# Patient Record
Sex: FEMALE | Race: WHITE | NOT HISPANIC OR LATINO
[De-identification: names, ages, dates, MRNs, and addresses within clinical notes are randomized per-mention and may not be internally consistent; named-entity substitution may affect disease eponyms.]

---

## 2021-11-08 ENCOUNTER — RESULT REVIEW (OUTPATIENT)
Age: 31
End: 2021-11-08

## 2022-03-11 ENCOUNTER — OUTPATIENT (OUTPATIENT)
Dept: OUTPATIENT SERVICES | Age: 32
LOS: 1 days | Discharge: HOME | End: 2022-03-11

## 2022-03-11 PROBLEM — Z00.00 ENCOUNTER FOR PREVENTIVE HEALTH EXAMINATION: Status: ACTIVE | Noted: 2022-03-11

## 2022-03-14 ENCOUNTER — APPOINTMENT (OUTPATIENT)
Dept: PEDIATRIC CARDIOLOGY | Facility: CLINIC | Age: 32
End: 2022-03-14
Payer: COMMERCIAL

## 2022-03-22 ENCOUNTER — OUTPATIENT (OUTPATIENT)
Dept: OUTPATIENT SERVICES | Age: 32
LOS: 1 days | Discharge: HOME | End: 2022-03-22

## 2022-03-23 ENCOUNTER — APPOINTMENT (OUTPATIENT)
Dept: PEDIATRIC CARDIOLOGY | Facility: CLINIC | Age: 32
End: 2022-03-23
Payer: COMMERCIAL

## 2022-03-23 PROCEDURE — 99203 OFFICE O/P NEW LOW 30 MIN: CPT | Mod: 25

## 2022-03-23 PROCEDURE — 93325 DOPPLER ECHO COLOR FLOW MAPG: CPT | Mod: 59

## 2022-03-23 PROCEDURE — 76825 ECHO EXAM OF FETAL HEART: CPT

## 2022-03-23 PROCEDURE — 76820 UMBILICAL ARTERY ECHO: CPT

## 2022-03-23 PROCEDURE — 76827 ECHO EXAM OF FETAL HEART: CPT

## 2022-06-14 ENCOUNTER — OUTPATIENT (OUTPATIENT)
Dept: OUTPATIENT SERVICES | Facility: HOSPITAL | Age: 32
LOS: 1 days | End: 2022-06-14
Payer: COMMERCIAL

## 2022-06-14 DIAGNOSIS — Z11.52 ENCOUNTER FOR SCREENING FOR COVID-19: ICD-10-CM

## 2022-06-14 LAB — SARS-COV-2 RNA SPEC QL NAA+PROBE: SIGNIFICANT CHANGE UP

## 2022-06-14 PROCEDURE — U0003: CPT

## 2022-06-14 PROCEDURE — C9803: CPT

## 2022-06-14 PROCEDURE — U0005: CPT

## 2022-06-15 ENCOUNTER — TRANSCRIPTION ENCOUNTER (OUTPATIENT)
Age: 32
End: 2022-06-15

## 2022-06-16 ENCOUNTER — INPATIENT (INPATIENT)
Facility: HOSPITAL | Age: 32
LOS: 1 days | Discharge: HOME | End: 2022-06-18
Attending: OBSTETRICS & GYNECOLOGY | Admitting: OBSTETRICS & GYNECOLOGY
Payer: COMMERCIAL

## 2022-06-16 VITALS — DIASTOLIC BLOOD PRESSURE: 92 MMHG | SYSTOLIC BLOOD PRESSURE: 133 MMHG

## 2022-06-16 DIAGNOSIS — O48.0 POST-TERM PREGNANCY: ICD-10-CM

## 2022-06-16 DIAGNOSIS — K08.409 PARTIAL LOSS OF TEETH, UNSPECIFIED CAUSE, UNSPECIFIED CLASS: Chronic | ICD-10-CM

## 2022-06-16 LAB
ALBUMIN SERPL ELPH-MCNC: 3.4 G/DL — SIGNIFICANT CHANGE UP (ref 3.3–5)
ALP SERPL-CCNC: 177 U/L — HIGH (ref 40–120)
ALT FLD-CCNC: 10 U/L — SIGNIFICANT CHANGE UP (ref 10–45)
ANION GAP SERPL CALC-SCNC: 14 MMOL/L — SIGNIFICANT CHANGE UP (ref 5–17)
APTT BLD: 28.5 SEC — SIGNIFICANT CHANGE UP (ref 27.5–35.5)
AST SERPL-CCNC: 19 U/L — SIGNIFICANT CHANGE UP (ref 10–40)
BASOPHILS # BLD AUTO: 0.07 K/UL — SIGNIFICANT CHANGE UP (ref 0–0.2)
BASOPHILS NFR BLD AUTO: 0.8 % — SIGNIFICANT CHANGE UP (ref 0–2)
BILIRUB SERPL-MCNC: 0.2 MG/DL — SIGNIFICANT CHANGE UP (ref 0.2–1.2)
BUN SERPL-MCNC: 9 MG/DL — SIGNIFICANT CHANGE UP (ref 7–23)
CALCIUM SERPL-MCNC: 9 MG/DL — SIGNIFICANT CHANGE UP (ref 8.4–10.5)
CHLORIDE SERPL-SCNC: 102 MMOL/L — SIGNIFICANT CHANGE UP (ref 96–108)
CO2 SERPL-SCNC: 19 MMOL/L — LOW (ref 22–31)
COVID-19 SPIKE DOMAIN AB INTERP: POSITIVE
COVID-19 SPIKE DOMAIN ANTIBODY RESULT: >250 U/ML — HIGH
CREAT SERPL-MCNC: 0.66 MG/DL — SIGNIFICANT CHANGE UP (ref 0.5–1.3)
EGFR: 119 ML/MIN/1.73M2 — SIGNIFICANT CHANGE UP
EOSINOPHIL # BLD AUTO: 0.12 K/UL — SIGNIFICANT CHANGE UP (ref 0–0.5)
EOSINOPHIL NFR BLD AUTO: 1.3 % — SIGNIFICANT CHANGE UP (ref 0–6)
FIBRINOGEN PPP-MCNC: 611 MG/DL — HIGH (ref 330–520)
GLUCOSE SERPL-MCNC: 72 MG/DL — SIGNIFICANT CHANGE UP (ref 70–99)
HCT VFR BLD CALC: 32.6 % — LOW (ref 34.5–45)
HGB BLD-MCNC: 11.2 G/DL — LOW (ref 11.5–15.5)
IMM GRANULOCYTES NFR BLD AUTO: 2.3 % — HIGH (ref 0–1.5)
INR BLD: 0.98 RATIO — SIGNIFICANT CHANGE UP (ref 0.88–1.16)
LDH SERPL L TO P-CCNC: 164 U/L — SIGNIFICANT CHANGE UP (ref 50–242)
LYMPHOCYTES # BLD AUTO: 2.14 K/UL — SIGNIFICANT CHANGE UP (ref 1–3.3)
LYMPHOCYTES # BLD AUTO: 23.8 % — SIGNIFICANT CHANGE UP (ref 13–44)
MCHC RBC-ENTMCNC: 31.6 PG — SIGNIFICANT CHANGE UP (ref 27–34)
MCHC RBC-ENTMCNC: 34.4 GM/DL — SIGNIFICANT CHANGE UP (ref 32–36)
MCV RBC AUTO: 92.1 FL — SIGNIFICANT CHANGE UP (ref 80–100)
MONOCYTES # BLD AUTO: 0.75 K/UL — SIGNIFICANT CHANGE UP (ref 0–0.9)
MONOCYTES NFR BLD AUTO: 8.3 % — SIGNIFICANT CHANGE UP (ref 2–14)
NEUTROPHILS # BLD AUTO: 5.7 K/UL — SIGNIFICANT CHANGE UP (ref 1.8–7.4)
NEUTROPHILS NFR BLD AUTO: 63.5 % — SIGNIFICANT CHANGE UP (ref 43–77)
NRBC # BLD: 0 /100 WBCS — SIGNIFICANT CHANGE UP (ref 0–0)
PLATELET # BLD AUTO: 192 K/UL — SIGNIFICANT CHANGE UP (ref 150–400)
POTASSIUM SERPL-MCNC: 3.9 MMOL/L — SIGNIFICANT CHANGE UP (ref 3.5–5.3)
POTASSIUM SERPL-SCNC: 3.9 MMOL/L — SIGNIFICANT CHANGE UP (ref 3.5–5.3)
PROT SERPL-MCNC: 6.3 G/DL — SIGNIFICANT CHANGE UP (ref 6–8.3)
PROTHROM AB SERPL-ACNC: 11.4 SEC — SIGNIFICANT CHANGE UP (ref 10.5–13.4)
RBC # BLD: 3.54 M/UL — LOW (ref 3.8–5.2)
RBC # FLD: 13.1 % — SIGNIFICANT CHANGE UP (ref 10.3–14.5)
SARS-COV-2 IGG+IGM SERPL QL IA: >250 U/ML — HIGH
SARS-COV-2 IGG+IGM SERPL QL IA: POSITIVE
SODIUM SERPL-SCNC: 136 MMOL/L — SIGNIFICANT CHANGE UP (ref 135–145)
T PALLIDUM AB TITR SER: NEGATIVE — SIGNIFICANT CHANGE UP
URATE SERPL-MCNC: 6.7 MG/DL — SIGNIFICANT CHANGE UP (ref 2.5–7)
WBC # BLD: 8.99 K/UL — SIGNIFICANT CHANGE UP (ref 3.8–10.5)
WBC # FLD AUTO: 8.99 K/UL — SIGNIFICANT CHANGE UP (ref 3.8–10.5)

## 2022-06-16 RX ORDER — CITRIC ACID/SODIUM CITRATE 300-500 MG
15 SOLUTION, ORAL ORAL ONCE
Refills: 0 | Status: COMPLETED | OUTPATIENT
Start: 2022-06-16 | End: 2022-06-16

## 2022-06-16 RX ORDER — OXYCODONE HYDROCHLORIDE 5 MG/1
5 TABLET ORAL
Refills: 0 | Status: DISCONTINUED | OUTPATIENT
Start: 2022-06-16 | End: 2022-06-18

## 2022-06-16 RX ORDER — OXYTOCIN 10 UNIT/ML
333.33 VIAL (ML) INJECTION
Qty: 20 | Refills: 0 | Status: DISCONTINUED | OUTPATIENT
Start: 2022-06-16 | End: 2022-06-18

## 2022-06-16 RX ORDER — ONDANSETRON 8 MG/1
4 TABLET, FILM COATED ORAL EVERY 6 HOURS
Refills: 0 | Status: DISCONTINUED | OUTPATIENT
Start: 2022-06-16 | End: 2022-06-17

## 2022-06-16 RX ORDER — SODIUM CHLORIDE 9 MG/ML
1000 INJECTION, SOLUTION INTRAVENOUS
Refills: 0 | Status: DISCONTINUED | OUTPATIENT
Start: 2022-06-16 | End: 2022-06-16

## 2022-06-16 RX ORDER — SODIUM CHLORIDE 9 MG/ML
1000 INJECTION, SOLUTION INTRAVENOUS
Refills: 0 | Status: DISCONTINUED | OUTPATIENT
Start: 2022-06-16 | End: 2022-06-18

## 2022-06-16 RX ORDER — NALOXONE HYDROCHLORIDE 4 MG/.1ML
0.1 SPRAY NASAL
Refills: 0 | Status: DISCONTINUED | OUTPATIENT
Start: 2022-06-16 | End: 2022-06-17

## 2022-06-16 RX ORDER — TETANUS TOXOID, REDUCED DIPHTHERIA TOXOID AND ACELLULAR PERTUSSIS VACCINE, ADSORBED 5; 2.5; 8; 8; 2.5 [IU]/.5ML; [IU]/.5ML; UG/.5ML; UG/.5ML; UG/.5ML
0.5 SUSPENSION INTRAMUSCULAR ONCE
Refills: 0 | Status: DISCONTINUED | OUTPATIENT
Start: 2022-06-16 | End: 2022-06-18

## 2022-06-16 RX ORDER — SIMETHICONE 80 MG/1
80 TABLET, CHEWABLE ORAL EVERY 4 HOURS
Refills: 0 | Status: DISCONTINUED | OUTPATIENT
Start: 2022-06-16 | End: 2022-06-18

## 2022-06-16 RX ORDER — LANOLIN
1 OINTMENT (GRAM) TOPICAL EVERY 6 HOURS
Refills: 0 | Status: DISCONTINUED | OUTPATIENT
Start: 2022-06-16 | End: 2022-06-18

## 2022-06-16 RX ORDER — OXYCODONE HYDROCHLORIDE 5 MG/1
5 TABLET ORAL ONCE
Refills: 0 | Status: DISCONTINUED | OUTPATIENT
Start: 2022-06-16 | End: 2022-06-18

## 2022-06-16 RX ORDER — DIPHENHYDRAMINE HCL 50 MG
25 CAPSULE ORAL EVERY 6 HOURS
Refills: 0 | Status: DISCONTINUED | OUTPATIENT
Start: 2022-06-16 | End: 2022-06-18

## 2022-06-16 RX ORDER — FAMOTIDINE 10 MG/ML
20 INJECTION INTRAVENOUS ONCE
Refills: 0 | Status: COMPLETED | OUTPATIENT
Start: 2022-06-16 | End: 2022-06-16

## 2022-06-16 RX ORDER — DEXAMETHASONE 0.5 MG/5ML
4 ELIXIR ORAL EVERY 6 HOURS
Refills: 0 | Status: DISCONTINUED | OUTPATIENT
Start: 2022-06-16 | End: 2022-06-17

## 2022-06-16 RX ORDER — KETOROLAC TROMETHAMINE 30 MG/ML
30 SYRINGE (ML) INJECTION EVERY 6 HOURS
Refills: 0 | Status: COMPLETED | OUTPATIENT
Start: 2022-06-16 | End: 2022-06-18

## 2022-06-16 RX ORDER — HEPARIN SODIUM 5000 [USP'U]/ML
5000 INJECTION INTRAVENOUS; SUBCUTANEOUS EVERY 12 HOURS
Refills: 0 | Status: DISCONTINUED | OUTPATIENT
Start: 2022-06-16 | End: 2022-06-18

## 2022-06-16 RX ORDER — NALBUPHINE HYDROCHLORIDE 10 MG/ML
2.5 INJECTION, SOLUTION INTRAMUSCULAR; INTRAVENOUS; SUBCUTANEOUS EVERY 6 HOURS
Refills: 0 | Status: DISCONTINUED | OUTPATIENT
Start: 2022-06-16 | End: 2022-06-17

## 2022-06-16 RX ORDER — INFLUENZA VIRUS VACCINE 15; 15; 15; 15 UG/.5ML; UG/.5ML; UG/.5ML; UG/.5ML
0.5 SUSPENSION INTRAMUSCULAR ONCE
Refills: 0 | Status: DISCONTINUED | OUTPATIENT
Start: 2022-06-16 | End: 2022-06-18

## 2022-06-16 RX ORDER — MAGNESIUM HYDROXIDE 400 MG/1
30 TABLET, CHEWABLE ORAL
Refills: 0 | Status: DISCONTINUED | OUTPATIENT
Start: 2022-06-16 | End: 2022-06-18

## 2022-06-16 RX ORDER — MORPHINE SULFATE 50 MG/1
0.1 CAPSULE, EXTENDED RELEASE ORAL ONCE
Refills: 0 | Status: DISCONTINUED | OUTPATIENT
Start: 2022-06-16 | End: 2022-06-17

## 2022-06-16 RX ORDER — IBUPROFEN 200 MG
600 TABLET ORAL EVERY 6 HOURS
Refills: 0 | Status: COMPLETED | OUTPATIENT
Start: 2022-06-16 | End: 2023-05-15

## 2022-06-16 RX ORDER — SODIUM CHLORIDE 9 MG/ML
1000 INJECTION, SOLUTION INTRAVENOUS ONCE
Refills: 0 | Status: COMPLETED | OUTPATIENT
Start: 2022-06-16 | End: 2022-06-16

## 2022-06-16 RX ORDER — ACETAMINOPHEN 500 MG
975 TABLET ORAL
Refills: 0 | Status: DISCONTINUED | OUTPATIENT
Start: 2022-06-16 | End: 2022-06-18

## 2022-06-16 RX ADMIN — FAMOTIDINE 20 MILLIGRAM(S): 10 INJECTION INTRAVENOUS at 08:10

## 2022-06-16 RX ADMIN — HEPARIN SODIUM 5000 UNIT(S): 5000 INJECTION INTRAVENOUS; SUBCUTANEOUS at 18:08

## 2022-06-16 RX ADMIN — Medication 975 MILLIGRAM(S): at 20:58

## 2022-06-16 RX ADMIN — SODIUM CHLORIDE 2000 MILLILITER(S): 9 INJECTION, SOLUTION INTRAVENOUS at 07:30

## 2022-06-16 RX ADMIN — Medication 975 MILLIGRAM(S): at 20:28

## 2022-06-16 RX ADMIN — Medication 30 MILLIGRAM(S): at 18:08

## 2022-06-16 RX ADMIN — Medication 30 MILLIGRAM(S): at 18:32

## 2022-06-16 RX ADMIN — Medication 15 MILLILITER(S): at 08:10

## 2022-06-16 NOTE — OB PROVIDER DELIVERY SUMMARY - NSPROVIDERDELIVERYNOTE_OBGYN_ALL_OB_FT
Vacuum assisted primary low transverse  section performed, EBL 800cc. grossly normal uterus, tubes and ovaries. hemostasis achieved. loose nuchal cord x 1. delivered a viable male  weight 7lbs 12oz, apgar 8 and 9. IVF 2100mL, UOP 1400 mL.

## 2022-06-16 NOTE — OB RN INTRAOPERATIVE NOTE - NS_ADDITIONALPROCINFO1_OBGYN_ALL_OB_FT
pt failed caitlin test after spinal placement #1; pt repositioned to left lateral position and spinal placement # 2 by MD Singh; successful caitlin test and case proceeded

## 2022-06-16 NOTE — OB NEONATOLOGY/PEDIATRICIAN DELIVERY SUMMARY - NSPEDSNEONOTESA_OBGYN_ALL_OB_FT
room air Baby boy born at 40 6/7 wks via elective C/S to a 34 y/o  mother who is O+ blood type, HBsAg neg, HIV neg, rubella immune, RPR neg, GBS neg as of . Prenatal history of IVF. No significant maternal history. AROM at delivery with clear fluids. Baby emerged with NC x1. Cord clamping was delayed 30 secs. Infant placed on warmer, dried and stimulated. CPAP started at 3 mins of life for increased WOB with mild grunting. CPAP 5 with max Fio2 21% given for 20 mins. APGARS of 8/9 given. Mom would like to breast, consents to the birth dose of Hep B and circ. No EOS as there was no rupture of labor.

## 2022-06-16 NOTE — OB PROVIDER H&P - ATTENDING COMMENTS
pt admitted for primary c/s after office discussion re induction for post dates vx c/s    since cx was unfavorable and pelvis was clinically contracted on exam, pt elected to do c/s    pros/cons reviewed

## 2022-06-16 NOTE — OB RN PATIENT PROFILE - NUTRITION PROFILE
Addended by: JUNAID XIAO on: 2/10/2022 03:51 PM     Modules accepted: Orders     No indicators present

## 2022-06-16 NOTE — OB PROVIDER DELIVERY SUMMARY - NSSELHIDDEN_OBGYN_ALL_OB_FT
[NS_DeliveryAttending1_OBGYN_ALL_OB_FT:[id number]],[NS_DeliveryAssist1_OBGYN_ALL_OB_FT:[id number]=],[NS_DeliveryRN_OBGYN_ALL_OB_FT:[id number]==]

## 2022-06-16 NOTE — OB RN PATIENT PROFILE - FALL HARM RISK - UNIVERSAL INTERVENTIONS
Bed in lowest position, wheels locked, appropriate side rails in place/Call bell, personal items and telephone in reach/Instruct patient to call for assistance before getting out of bed or chair/Non-slip footwear when patient is out of bed/[place] to call system/Physically safe environment - no spills, clutter or unnecessary equipment/Purposeful Proactive Rounding/Room/bathroom lighting operational, light cord in reach

## 2022-06-16 NOTE — OB RN DELIVERY SUMMARY - NSSELHIDDEN_OBGYN_ALL_OB_FT
[NS_DeliveryAttending1_OBGYN_ALL_OB_FT:[id number]],[NS_DeliveryAssist1_OBGYN_ALL_OB_FT:[id number]=],[NS_DeliveryRN_OBGYN_ALL_OB_FT:[id number]]

## 2022-06-16 NOTE — OB RN INTRAOPERATIVE NOTE - NS_ORROOM _OBGYN_ALL_OB
[Normal Breath Sounds] : Normal breath sounds [Normal Heart Sounds] : normal heart sounds [Calm] : calm [de-identified] : well developed male in no acute distress [de-identified] : normal bowel sounds, wilthout distension or tenderness \par Incisions clean and closed [de-identified] : repair intact withouit swelling [de-identified] : without calf paiin or swelling A

## 2022-06-16 NOTE — OB PROVIDER H&P - NSHPLABSRESULTS_GEN_ALL_CORE
T(C): --  HR: 83 (06-16-22 @ 07:30) (76 - 87)  BP: 137/94 (06-16-22 @ 07:26) (133/92 - 137/94)  RR: --  SpO2: 97% (06-16-22 @ 07:30) (96% - 98%)

## 2022-06-16 NOTE — OB PROVIDER H&P - BIRTH SEX
[facility]  [address], [address]  [facility] [address]  Phone: [phone]  Fax: [phone]                  Herminia Wooten   5/15/2017 9:40 AM   Office Visit    Description:  Female : 1950   Provider:  Emerald Coronel MD   Department:  [facility]           Reason for Visit     Pre-op Exam                To Do List           Goals (5 Years of Data)     None      [facility]     [facility] On [facility] Nurse Care Line -  Assistance  Unless otherwise directed by your provider, please contact [facility], our nurse care line that is available for  assistance.     Registered nurses in the [facility] provide: appointment scheduling, clinical advisement, health education, and other advisory services.  Call: [phone] (toll free)               Medications           Message regarding Medications     Verify the changes and/or additions to your medication regime listed below are the same as discussed with your clinician today.  If any of these changes or additions are incorrect, please notify your healthcare provider.             Verify that the below list of medications is an accurate representation of the medications you are currently taking.  If none reported, the list may be blank. If incorrect, please contact your healthcare provider. Carry this list with you in case of emergency.           Current Medications     alprazolam (XANAX) 0.25 MG tablet Take 1 tablet (0.25 mg total) by mouth On call Procedure for Anxiety. 1 tablet 1 hour prior to scan, if needed can repeat at start of scan    atenolol (TENORMIN) 25 MG tablet Take 12.5 mg by mouth once daily.     atorvastatin (LIPITOR) 20 MG tablet Take 20 mg by mouth once daily.    cholecalciferol, vitamin D3, (VITAMIN D3) 2,000 unit Tab Take by mouth once daily.    clobetasol (TEMOVATE) 0.05 % cream APPLY 1/2 GRAM TOPICALLY twice weekly    estradiol (ESTRACE) 0.01 % (0.1  "mg/gram) vaginal cream Place 1 g vaginally every other day.    fluticasone (FLONASE) 50 mcg/actuation nasal spray 1 spray by Each Nare route once daily.    ibuprofen (ADVIL,MOTRIN) 600 MG tablet Take 1 tablet (600 mg total) by mouth every 6 (six) hours as needed for Pain.    Lactobacillus rhamnosus GG (CULTURELLE) 10 billion cell capsule Take 1 capsule by mouth once daily.    levothyroxine (SYNTHROID) 25 MCG tablet 25 mcg once daily.     lorazepam (ATIVAN) 0.5 MG tablet Take 0.5 mg by mouth every 12 (twelve) hours as needed.     losartan (COZAAR) 50 MG tablet Take 25 mg by mouth once daily.     meclizine (ANTIVERT) 25 mg tablet 25 mg 3 (three) times daily as needed.     mometasone 220 mcg (30 doses) inhaler Inhale 2 puffs into the lungs once daily.    ranitidine (ZANTAC) 150 MG tablet Take 2 tablets (300 mg total) by mouth nightly.    sodium chloride (SALINE NASAL) 0.65 % nasal spray 1 spray by Nasal route as needed.    triamcinolone acetonide 0.1% (KENALOG) 0.1 % paste     ACETYLCYSTEINE (N-ACETYL-L-CYSTEINE MISC) 600 mg by Misc.(Non-Drug; Combo Route) route once daily.    aspirin (ECOTRIN) 81 MG EC tablet Take 81 mg by mouth once daily.    betamethasone valerate 0.1% (VALISONE) 0.1 % Crea Apply topically 2 (two) times daily.    [facility] 118-10-40.8-36 mg Cap Take 1 capsule by mouth once daily.    ondansetron (ZOFRAN-ODT) 4 MG TbDL Take 1-2 tablets (4-8 mg total) by mouth every 8 (eight) hours as needed.    phenazopyridine (PYRIDIUM) 100 MG tablet TAKE ONE TABLET BY MOUTH THREE TIMES DAILY AS NEEDED FOR PAIN           Clinical Reference Information           Your Vitals Were     BP Height Weight Last Period BMI    128/84 4' 11.5" (1.511 m) 79 kg (174 lb 2.6 oz) 10/07/1976 34.59 kg/m2      Blood Pressure          Most Recent Value    BP  128/84      Allergies as of 5/15/2017     Codeine    Caffeine    Percodan [Oxycodone Hcl-oxycodone-asa]      Immunizations Administered on Date of Encounter - " 5/15/2017     None      Language Assistance Services     ATTENTION: Language assistance services are available, free of charge. Please call [phone].      ATENCIÓN: Si habla whitney, tiene a arias disposición servicios gratuitos de asistencia lingüística. Llame al [phone].     CHÚ Ý: N?u b?n nói Ti?ng Vi?t, có các d?ch v? h? tr? ngôn ng? mi?n phí dành cho b?n. G?i s? [phone].         [facility] complies with applicable Federal civil rights laws and does not discriminate on the basis of race, color, national origin, age, disability, or sex.         Female

## 2022-06-16 NOTE — OB RN PATIENT PROFILE - PRO FEEDING PLAN INFANT OB
Joe Garcia is a 38year old female who presents for Patient presents with:  Leg Pain: muscle pain  Arm Pain: muscle pain  . HPI:     I had the pleasure of seeing Joe Garcia on 3/15/2018 for evaluation.      She is a pleasant 38year old who • PONV (postoperative nausea and vomiting)     CHILLS, UNCONTROLLED SHIVERING    • Postpartum depression    • Pregnancy , ,        • Spondylolisthesis of lumbosacral region 2017   • Vitamin B12 deficiency    • Vitamin D deficiency her left side.    Eyes: No visual changes,   CVS: No chest pain, no heart disease  RS: No SOB, no Cough, No Pleurtic pain,, some Peters,    GI: No nausea, no vomiiting, no abominal pain, no hx of ulcer, no gastritis, no heartburn, no dyshpagia, no BRBPR or parvin Hematocrit      35.0 - 48.0 % 41.5   MCV      80.0 - 100.0 fL 91.3   MCH      27.0 - 32.0 pg 30.4   MCHC      32.0 - 37.0 g/dl 33.3   RDW      11.0 - 15.0 % 13.0   Platelet Count      218 - 400 K/   MEAN PLATELET VOLUME      7.4 - 10.3 fL 9.3   Mitchell inflammatory arthritis and/or connective tissue disease   rF negative - will check CCP   In the past SAMMY has been negative several times   -consider muscle relaxers at night - flexeril 5mg at [facility].    - she sleeps during the day b/c her job is at [employer] initiation of breastfeeding/breast milk feeding

## 2022-06-16 NOTE — OB PROVIDER H&P - NSHPPHYSICALEXAM_GEN_ALL_CORE
Gen: NAD  CV: RRR  Pulm: breathing comfortably on RA  Abd: gravid, nontender  Extr: moving all extremities with ease  – FHT Cat I: baseline 120, mod variability, +accels, -decels  – [place]: absent  – Sono: vertex, posterior placenta

## 2022-06-16 NOTE — OB PROVIDER H&P - HISTORY OF PRESENT ILLNESS
HPI: 31yo F  @40+6  presents for scheduled pLTCS. +FM. -LOF. -CTXs. -VB. Pt denies any other concerns. IVF pregnancy    PNC: Denies prenatal issues.   GBS neg.    EFW 3770g by neela on     OBHx: primigravida  GynHx: denies hx STIs, fibroids, polyps, cysts  PMH: denies hx clotting or bleeding disorders, HTN, DM  PSH: denies  PFH: no hx congenital disorders, bleeding/clotting disorders  Psych: denies   Social: denies etoh, smoking, drugs. Safe at home/in relationship.  Meds: PNV, iron, ASA 81mg  Allergies: NKDA  Will accept blood transfusions? Yes

## 2022-06-16 NOTE — OB PROVIDER H&P - ASSESSMENT
31yo  @40+6 for elective pLTCS for macrosomia    Plan  - Admit to LND. Routine Labs. IVF.  - pLTCS  - Fetus: cat 1 tracing. VTX. EFW extrapolated to approx 3970g by sono. Continuous EFM. No concerns.  - Prenatal issues: macrosomia  - GBS neg  - pba, Pepcid    Patient discussed with attending physician, Dr. Dexter.  OCTAVIO Lentz, PGY1

## 2022-06-17 LAB
BASOPHILS # BLD AUTO: 0.05 K/UL — SIGNIFICANT CHANGE UP (ref 0–0.2)
BASOPHILS NFR BLD AUTO: 0.3 % — SIGNIFICANT CHANGE UP (ref 0–2)
EOSINOPHIL # BLD AUTO: 0.04 K/UL — SIGNIFICANT CHANGE UP (ref 0–0.5)
EOSINOPHIL NFR BLD AUTO: 0.3 % — SIGNIFICANT CHANGE UP (ref 0–6)
HCT VFR BLD CALC: 26.4 % — LOW (ref 34.5–45)
HGB BLD-MCNC: 8.7 G/DL — LOW (ref 11.5–15.5)
IMM GRANULOCYTES NFR BLD AUTO: 1.1 % — SIGNIFICANT CHANGE UP (ref 0–1.5)
LYMPHOCYTES # BLD AUTO: 16.6 % — SIGNIFICANT CHANGE UP (ref 13–44)
LYMPHOCYTES # BLD AUTO: 2.54 K/UL — SIGNIFICANT CHANGE UP (ref 1–3.3)
MCHC RBC-ENTMCNC: 31 PG — SIGNIFICANT CHANGE UP (ref 27–34)
MCHC RBC-ENTMCNC: 33 GM/DL — SIGNIFICANT CHANGE UP (ref 32–36)
MCV RBC AUTO: 94 FL — SIGNIFICANT CHANGE UP (ref 80–100)
MONOCYTES # BLD AUTO: 1 K/UL — HIGH (ref 0–0.9)
MONOCYTES NFR BLD AUTO: 6.5 % — SIGNIFICANT CHANGE UP (ref 2–14)
NEUTROPHILS # BLD AUTO: 11.52 K/UL — HIGH (ref 1.8–7.4)
NEUTROPHILS NFR BLD AUTO: 75.2 % — SIGNIFICANT CHANGE UP (ref 43–77)
NRBC # BLD: 0 /100 WBCS — SIGNIFICANT CHANGE UP (ref 0–0)
PLATELET # BLD AUTO: 200 K/UL — SIGNIFICANT CHANGE UP (ref 150–400)
RBC # BLD: 2.81 M/UL — LOW (ref 3.8–5.2)
RBC # FLD: 13.2 % — SIGNIFICANT CHANGE UP (ref 10.3–14.5)
WBC # BLD: 15.32 K/UL — HIGH (ref 3.8–10.5)
WBC # FLD AUTO: 15.32 K/UL — HIGH (ref 3.8–10.5)

## 2022-06-17 RX ORDER — IBUPROFEN 200 MG
600 TABLET ORAL EVERY 6 HOURS
Refills: 0 | Status: DISCONTINUED | OUTPATIENT
Start: 2022-06-17 | End: 2022-06-18

## 2022-06-17 RX ADMIN — Medication 975 MILLIGRAM(S): at 03:30

## 2022-06-17 RX ADMIN — Medication 975 MILLIGRAM(S): at 21:57

## 2022-06-17 RX ADMIN — Medication 30 MILLIGRAM(S): at 11:11

## 2022-06-17 RX ADMIN — Medication 975 MILLIGRAM(S): at 22:25

## 2022-06-17 RX ADMIN — Medication 975 MILLIGRAM(S): at 15:31

## 2022-06-17 RX ADMIN — Medication 600 MILLIGRAM(S): at 18:30

## 2022-06-17 RX ADMIN — HEPARIN SODIUM 5000 UNIT(S): 5000 INJECTION INTRAVENOUS; SUBCUTANEOUS at 05:58

## 2022-06-17 RX ADMIN — Medication 30 MILLIGRAM(S): at 06:29

## 2022-06-17 RX ADMIN — HEPARIN SODIUM 5000 UNIT(S): 5000 INJECTION INTRAVENOUS; SUBCUTANEOUS at 18:01

## 2022-06-17 RX ADMIN — Medication 975 MILLIGRAM(S): at 15:01

## 2022-06-17 RX ADMIN — Medication 30 MILLIGRAM(S): at 05:59

## 2022-06-17 RX ADMIN — Medication 975 MILLIGRAM(S): at 09:02

## 2022-06-17 RX ADMIN — Medication 30 MILLIGRAM(S): at 00:05

## 2022-06-17 RX ADMIN — Medication 30 MILLIGRAM(S): at 11:41

## 2022-06-17 RX ADMIN — Medication 975 MILLIGRAM(S): at 09:42

## 2022-06-17 RX ADMIN — Medication 975 MILLIGRAM(S): at 03:00

## 2022-06-17 RX ADMIN — Medication 600 MILLIGRAM(S): at 17:53

## 2022-06-17 RX ADMIN — Medication 30 MILLIGRAM(S): at 00:35

## 2022-06-17 NOTE — PROGRESS NOTE ADULT - ASSESSMENT
A/P: 31yo POD#1 s/p LTCS.  Patient is stable and doing well post-operatively.    - Continue regular diet.  - Increase ambulation.  - Continue motrin, tylenol, oxycodone PRN for pain control  - F/u AM CBC    CLARENCE Quinteros MD  PGY-1

## 2022-06-18 ENCOUNTER — TRANSCRIPTION ENCOUNTER (OUTPATIENT)
Age: 32
End: 2022-06-18

## 2022-06-18 PROCEDURE — 86900 BLOOD TYPING SEROLOGIC ABO: CPT

## 2022-06-18 PROCEDURE — 59050 FETAL MONITOR W/REPORT: CPT

## 2022-06-18 PROCEDURE — 85025 COMPLETE CBC W/AUTO DIFF WBC: CPT

## 2022-06-18 PROCEDURE — 83615 LACTATE (LD) (LDH) ENZYME: CPT

## 2022-06-18 PROCEDURE — 86769 SARS-COV-2 COVID-19 ANTIBODY: CPT

## 2022-06-18 PROCEDURE — 85610 PROTHROMBIN TIME: CPT

## 2022-06-18 PROCEDURE — 86901 BLOOD TYPING SEROLOGIC RH(D): CPT

## 2022-06-18 PROCEDURE — 80053 COMPREHEN METABOLIC PANEL: CPT

## 2022-06-18 PROCEDURE — 59025 FETAL NON-STRESS TEST: CPT

## 2022-06-18 PROCEDURE — 85730 THROMBOPLASTIN TIME PARTIAL: CPT

## 2022-06-18 PROCEDURE — 86780 TREPONEMA PALLIDUM: CPT

## 2022-06-18 PROCEDURE — 85384 FIBRINOGEN ACTIVITY: CPT

## 2022-06-18 PROCEDURE — 84550 ASSAY OF BLOOD/URIC ACID: CPT

## 2022-06-18 PROCEDURE — 86850 RBC ANTIBODY SCREEN: CPT

## 2022-06-18 RX ORDER — IBUPROFEN 200 MG
1 TABLET ORAL
Qty: 0 | Refills: 0 | DISCHARGE
Start: 2022-06-18

## 2022-06-18 RX ORDER — ACETAMINOPHEN 500 MG
3 TABLET ORAL
Qty: 0 | Refills: 0 | DISCHARGE
Start: 2022-06-18

## 2022-06-18 RX ADMIN — Medication 600 MILLIGRAM(S): at 06:15

## 2022-06-18 RX ADMIN — Medication 600 MILLIGRAM(S): at 11:33

## 2022-06-18 RX ADMIN — Medication 600 MILLIGRAM(S): at 01:15

## 2022-06-18 RX ADMIN — Medication 975 MILLIGRAM(S): at 09:15

## 2022-06-18 RX ADMIN — Medication 975 MILLIGRAM(S): at 08:37

## 2022-06-18 RX ADMIN — HEPARIN SODIUM 5000 UNIT(S): 5000 INJECTION INTRAVENOUS; SUBCUTANEOUS at 05:49

## 2022-06-18 RX ADMIN — Medication 975 MILLIGRAM(S): at 02:57

## 2022-06-18 RX ADMIN — Medication 600 MILLIGRAM(S): at 00:41

## 2022-06-18 RX ADMIN — Medication 600 MILLIGRAM(S): at 05:49

## 2022-06-18 RX ADMIN — Medication 975 MILLIGRAM(S): at 03:30

## 2022-06-18 RX ADMIN — Medication 600 MILLIGRAM(S): at 12:15

## 2022-06-18 NOTE — DISCHARGE NOTE OB - NS MD DC FALL RISK RISK
For information on Fall & Injury Prevention, visit: [email or web address] OR  [email or web address] OR  https://www.cdc.gov/steadi/patient.html

## 2022-06-18 NOTE — PROGRESS NOTE ADULT - ASSESSMENT
A/P: 33yo POD#2 s/p LTCS.  Patient is stable and doing well post-operatively.    - Continue regular diet.  - Increase ambulation.  - Continue motrin, tylenol, oxycodone PRN for pain control    CLARENCE Quinteros MD  PGY-1

## 2022-06-18 NOTE — PROGRESS NOTE ADULT - SUBJECTIVE AND OBJECTIVE BOX
Day 1 of Anesthesia Pain Management Service    SUBJECTIVE:  Pain Scale Score:          [X] Refer to charted pain scores    THERAPY: Received PF spinal morphine as above    OBJECTIVE:    Sedation:        	[X] Alert	[ ] Drowsy	[ ] Arousable      [ ] Asleep       [ ] Unresponsive    Side Effects:	[X] None	[ ] Nausea	[ ] Vomiting         [ ] Pruritus  		[ ] Weakness            [ ] Numbness	          [ ] Other:    ASSESSMENT/ PLAN  [X] Patient transitioned to prn analgesics  [X] Pain management per primary service, pain service to sign off   [X]Documentation and Verification of current medications
Day 1 of Anesthesia Pain Management Service    SUBJECTIVE: Doing ok  Pain Scale Score:          [X] Refer to charted pain scores    THERAPY:    s/p     100mcg PF morphine on 6\16\2022      MEDICATIONS  (STANDING):  acetaminophen     Tablet .. 975 milliGRAM(s) Oral <User Schedule>  diphtheria/tetanus/pertussis (acellular) Vaccine (ADAcel) 0.5 milliLiter(s) IntraMuscular once  heparin   Injectable 5000 Unit(s) SubCutaneous every 12 hours  ibuprofen  Tablet. 600 milliGRAM(s) Oral every 6 hours  influenza   Vaccine 0.5 milliLiter(s) IntraMuscular once  ketorolac   Injectable 30 milliGRAM(s) IV Push every 6 hours  lactated ringers. 1000 milliLiter(s) (125 mL/Hr) IV Continuous <Continuous>  morphine PF Spinal 0.1 milliGRAM(s) IntraThecal. once  oxytocin Infusion 333.333 milliUNIT(s)/Min (1000 mL/Hr) IV Continuous <Continuous>  oxytocin Infusion 333.333 milliUNIT(s)/Min (1000 mL/Hr) IV Continuous <Continuous>    MEDICATIONS  (PRN):  dexAMETHasone  Injectable 4 milliGRAM(s) IV Push every 6 hours PRN Nausea  diphenhydrAMINE 25 milliGRAM(s) Oral every 6 hours PRN Pruritus  lanolin Ointment 1 Application(s) Topical every 6 hours PRN Sore Nipples  magnesium hydroxide Suspension 30 milliLiter(s) Oral two times a day PRN Constipation  nalbuphine Injectable 2.5 milliGRAM(s) IV Push every 6 hours PRN Pruritus  naloxone Injectable 0.1 milliGRAM(s) IV Push every 3 minutes PRN For ANY of the following changes in patient status:  A. Breaths Per Minute LESS THAN 10, B. Oxygen saturation LESS THAN 90%, C. Sedation score of 6 for Stop After: 4 Times  ondansetron Injectable 4 milliGRAM(s) IV Push every 6 hours PRN Nausea  oxyCODONE    IR 5 milliGRAM(s) Oral every 3 hours PRN Moderate to Severe Pain (4-10)  oxyCODONE    IR 5 milliGRAM(s) Oral once PRN Moderate to Severe Pain (4-10)  simethicone 80 milliGRAM(s) Chew every 4 hours PRN Gas      OBJECTIVE:    Sedation:        	[X] Alert	 [ ] Drowsy	[ ] Arousable      [ ] Asleep       [ ] Unresponsive    Side Effects:	[X] None 	[ ] Nausea	[ ] Vomiting         [ ] Pruritus  		[ ] Weakness            [ ] Numbness	          [ ] Other:    Vital Signs Last 24 Hrs  T(C): 36.7 (17 Jun 2022 04:55), Max: 37.2 (16 Jun 2022 16:40)  T(F): 98.1 (17 Jun 2022 04:55), Max: 99 (16 Jun 2022 16:40)  HR: 90 (17 Jun 2022 04:55) (75 - 100)  BP: 122/73 (17 Jun 2022 04:55) (119/76 - 141/84)  BP(mean): 91 (16 Jun 2022 15:00) (90 - 106)  RR: 18 (17 Jun 2022 04:55) (13 - 21)  SpO2: 99% (17 Jun 2022 04:55) (96% - 100%)    ASSESSMENT/ PLAN  [X] Patient transitioned to prn analgesics  [X] Pain management per primary service, pain service to sign off   [X]Documentation and Verification of current medications
  S: 33yo POD#2 s/p LTCS. Pain is well controlled. She is tolerating a regular diet and passing flatus. She is voiding spontaneously, and ambulating without difficulty. Denies CP/SOB. Denies lightheadedness/dizziness. Denies N/V.    O:  Vitals:  Vital Signs Last 24 Hrs  T(C): 36.3 (18 Jun 2022 06:08), Max: 36.4 (17 Jun 2022 09:00)  T(F): 97.4 (18 Jun 2022 06:08), Max: 97.5 (17 Jun 2022 09:00)  HR: 71 (18 Jun 2022 06:08) (71 - 88)  BP: 132/86 (18 Jun 2022 06:08) (114/65 - 132/86)  BP(mean): --  RR: 18 (18 Jun 2022 06:08) (18 - 18)  SpO2: 100% (18 Jun 2022 06:08) (97% - 100%)    MEDICATIONS  (STANDING):  acetaminophen     Tablet .. 975 milliGRAM(s) Oral <User Schedule>  diphtheria/tetanus/pertussis (acellular) Vaccine (ADAcel) 0.5 milliLiter(s) IntraMuscular once  heparin   Injectable 5000 Unit(s) SubCutaneous every 12 hours  ibuprofen  Tablet. 600 milliGRAM(s) Oral every 6 hours  influenza   Vaccine 0.5 milliLiter(s) IntraMuscular once  ketorolac   Injectable 30 milliGRAM(s) IV Push every 6 hours  lactated ringers. 1000 milliLiter(s) (125 mL/Hr) IV Continuous <Continuous>  oxytocin Infusion 333.333 milliUNIT(s)/Min (1000 mL/Hr) IV Continuous <Continuous>  oxytocin Infusion 333.333 milliUNIT(s)/Min (1000 mL/Hr) IV Continuous <Continuous>      MEDICATIONS  (PRN):  diphenhydrAMINE 25 milliGRAM(s) Oral every 6 hours PRN Pruritus  lanolin Ointment 1 Application(s) Topical every 6 hours PRN Sore Nipples  magnesium hydroxide Suspension 30 milliLiter(s) Oral two times a day PRN Constipation  oxyCODONE    IR 5 milliGRAM(s) Oral every 3 hours PRN Moderate to Severe Pain (4-10)  oxyCODONE    IR 5 milliGRAM(s) Oral once PRN Moderate to Severe Pain (4-10)  simethicone 80 milliGRAM(s) Chew every 4 hours PRN Gas      Labs:  Blood type: O Positive  Rubella IgG: RPR: Negative                          8.7<L>   15.32<H> >-----------< 200    ( 06-17 @ 06:37 )             26.4<L>                        11.2<L>   8.99 >-----------< 192    ( 06-16 @ 07:33 )             32.6<L>    06-16-22 @ 07:57      136  |  102  |  9   ----------------------------<  72  3.9   |  19<L>  |  0.66        Ca    9.0      16 Jun 2022 07:57    TPro  6.3  /  Alb  3.4  /  TBili  0.2  /  DBili  x   /  AST  19  /  ALT  10  /  AlkPhos  177<H>  06-16-22 @ 07:57          PE:  General: NAD  Abdomen: Soft, appropriately tender, incision c/d/i.  Extremities: No erythema, no pitting edema  
S: 31yo POD#1 s/p LTCS . Her pain is well controlled. She is tolerating a regular diet but is not yet passing flatus. Denies N/V. Denies CP/SOB/lightheadedness/dizziness.  She is ambulating without difficulty.  Voiding spontaneously     O:   Vital Signs Last 24 Hrs  T(C): 37 (17 Jun 2022 00:45), Max: 37.2 (16 Jun 2022 16:40)  T(F): 98.6 (17 Jun 2022 00:45), Max: 99 (16 Jun 2022 16:40)  HR: 76 (17 Jun 2022 00:45) (75 - 101)  BP: 119/76 (17 Jun 2022 00:45) (119/76 - 145/88)  BP(mean): 91 (16 Jun 2022 15:00) (90 - 106)  RR: 18 (17 Jun 2022 00:45) (13 - 21)  SpO2: 98% (17 Jun 2022 00:45) (93% - 100%)    Labs:  Blood type: O Positive  Rubella IgG: RPR: Negative                          11.2<L>   8.99 >-----------< 192    ( 06-16 @ 07:33 )             32.6<L>    06-16-22 @ 07:57      136  |  102  |  9   ----------------------------<  72  3.9   |  19<L>  |  0.66        Ca    9.0      16 Jun 2022 07:57    TPro  6.3  /  Alb  3.4  /  TBili  0.2  /  DBili  x   /  AST  19  /  ALT  10  /  AlkPhos  177<H>  06-16-22 @ 07:57          acetaminophen     Tablet .. 975 milliGRAM(s) Oral <User Schedule>  dexAMETHasone  Injectable 4 milliGRAM(s) IV Push every 6 hours PRN  diphenhydrAMINE 25 milliGRAM(s) Oral every 6 hours PRN  diphtheria/tetanus/pertussis (acellular) Vaccine (ADAcel) 0.5 milliLiter(s) IntraMuscular once  heparin   Injectable 5000 Unit(s) SubCutaneous every 12 hours  ibuprofen  Tablet. 600 milliGRAM(s) Oral every 6 hours  influenza   Vaccine 0.5 milliLiter(s) IntraMuscular once  ketorolac   Injectable 30 milliGRAM(s) IV Push every 6 hours  lactated ringers. 1000 milliLiter(s) IV Continuous <Continuous>  lanolin Ointment 1 Application(s) Topical every 6 hours PRN  magnesium hydroxide Suspension 30 milliLiter(s) Oral two times a day PRN  morphine PF Spinal 0.1 milliGRAM(s) IntraThecal. once  nalbuphine Injectable 2.5 milliGRAM(s) IV Push every 6 hours PRN  naloxone Injectable 0.1 milliGRAM(s) IV Push every 3 minutes PRN  ondansetron Injectable 4 milliGRAM(s) IV Push every 6 hours PRN  oxyCODONE    IR 5 milliGRAM(s) Oral every 3 hours PRN  oxyCODONE    IR 5 milliGRAM(s) Oral once PRN  oxytocin Infusion 333.333 milliUNIT(s)/Min IV Continuous <Continuous>  oxytocin Infusion 333.333 milliUNIT(s)/Min IV Continuous <Continuous>  simethicone 80 milliGRAM(s) Chew every 4 hours PRN      PE:  General: NAD  Abdomen: Mildly distended, appropriately tender, incision c/d/i.  Extremities: No erythema, no pitting edema

## 2022-06-18 NOTE — DISCHARGE NOTE OB - PATIENT PORTAL LINK FT
You can access the FollowMyHealth Patient Portal offered by [facility] by registering at the following website: [email or web address]. By joining [facility]’s FollowMyHealth portal, you will also be able to view your health information using other applications (apps) compatible with our system.

## 2022-06-18 NOTE — DISCHARGE NOTE OB - MATERIALS PROVIDED
Vaccinations/[facility]  Screening Program/  Immunization Record/Breastfeeding Log/Breastfeeding Mother’s Support Group Information/Guide to Postpartum Care/[facility] Hearing Screen Program/Back To Sleep Handout/Shaken Baby Prevention Handout/Breastfeeding Guide and Packet/Birth Certificate Instructions/Discharge Medication Information for Patients and Families Pocket Guide

## 2022-06-18 NOTE — DISCHARGE NOTE OB - CARE PROVIDER_API CALL
Steve Dexter (MD)  Obstetrics and Gynecology  [address], [address]  [place] [id number]  Phone: [phone]  Fax: [phone]  Follow Up Time:

## 2022-06-18 NOTE — DISCHARGE NOTE OB - CARE PLAN
1 Principal Discharge DX:	 delivery delivered  Assessment and plan of treatment:	reg diet, nothing per vagina

## 2022-06-18 NOTE — DISCHARGE NOTE OB - MEDICATION SUMMARY - MEDICATIONS TO TAKE
I will START or STAY ON the medications listed below when I get home from the hospital:    acetaminophen 325 mg oral tablet  -- 3 tab(s) by mouth 3 times a day  -- Indication: For No pertinent past medical history    ibuprofen 600 mg oral tablet  -- 1 tab(s) by mouth every 6 hours  -- Indication: For No pertinent past medical history

## 2022-06-19 ENCOUNTER — OUTPATIENT (OUTPATIENT)
Dept: OUTPATIENT SERVICES | Facility: HOSPITAL | Age: 32
LOS: 1 days | End: 2022-06-19
Payer: COMMERCIAL

## 2022-06-19 VITALS
HEART RATE: 84 BPM | SYSTOLIC BLOOD PRESSURE: 136 MMHG | DIASTOLIC BLOOD PRESSURE: 85 MMHG | OXYGEN SATURATION: 100 % | RESPIRATION RATE: 16 BRPM

## 2022-06-19 VITALS — OXYGEN SATURATION: 99 % | HEART RATE: 89 BPM

## 2022-06-19 VITALS
OXYGEN SATURATION: 100 % | SYSTOLIC BLOOD PRESSURE: 133 MMHG | DIASTOLIC BLOOD PRESSURE: 88 MMHG | TEMPERATURE: 98 F | HEART RATE: 100 BPM | RESPIRATION RATE: 18 BRPM

## 2022-06-19 DIAGNOSIS — O29.40: ICD-10-CM

## 2022-06-19 DIAGNOSIS — K08.409 PARTIAL LOSS OF TEETH, UNSPECIFIED CAUSE, UNSPECIFIED CLASS: Chronic | ICD-10-CM

## 2022-06-19 LAB
ALBUMIN SERPL ELPH-MCNC: 3.3 G/DL — SIGNIFICANT CHANGE UP (ref 3.3–5)
ALBUMIN SERPL ELPH-MCNC: 3.6 G/DL — SIGNIFICANT CHANGE UP (ref 3.3–5)
ALP SERPL-CCNC: 130 U/L — HIGH (ref 40–120)
ALP SERPL-CCNC: 138 U/L — HIGH (ref 40–120)
ALT FLD-CCNC: 47 U/L — HIGH (ref 10–45)
ALT FLD-CCNC: 53 U/L — HIGH (ref 10–45)
ANION GAP SERPL CALC-SCNC: 14 MMOL/L — SIGNIFICANT CHANGE UP (ref 5–17)
ANION GAP SERPL CALC-SCNC: 14 MMOL/L — SIGNIFICANT CHANGE UP (ref 5–17)
APTT BLD: 27.5 SEC — SIGNIFICANT CHANGE UP (ref 27.5–35.5)
APTT BLD: 28.6 SEC — SIGNIFICANT CHANGE UP (ref 27.5–35.5)
AST SERPL-CCNC: 60 U/L — HIGH (ref 10–40)
AST SERPL-CCNC: 64 U/L — HIGH (ref 10–40)
BASOPHILS # BLD AUTO: 0 K/UL — SIGNIFICANT CHANGE UP (ref 0–0.2)
BASOPHILS # BLD AUTO: 0.07 K/UL — SIGNIFICANT CHANGE UP (ref 0–0.2)
BASOPHILS NFR BLD AUTO: 0 % — SIGNIFICANT CHANGE UP (ref 0–2)
BASOPHILS NFR BLD AUTO: 0.7 % — SIGNIFICANT CHANGE UP (ref 0–2)
BILIRUB SERPL-MCNC: 0.2 MG/DL — SIGNIFICANT CHANGE UP (ref 0.2–1.2)
BILIRUB SERPL-MCNC: 0.2 MG/DL — SIGNIFICANT CHANGE UP (ref 0.2–1.2)
BUN SERPL-MCNC: 10 MG/DL — SIGNIFICANT CHANGE UP (ref 7–23)
BUN SERPL-MCNC: 12 MG/DL — SIGNIFICANT CHANGE UP (ref 7–23)
CALCIUM SERPL-MCNC: 8.8 MG/DL — SIGNIFICANT CHANGE UP (ref 8.4–10.5)
CALCIUM SERPL-MCNC: 9.1 MG/DL — SIGNIFICANT CHANGE UP (ref 8.4–10.5)
CHLORIDE SERPL-SCNC: 105 MMOL/L — SIGNIFICANT CHANGE UP (ref 96–108)
CHLORIDE SERPL-SCNC: 106 MMOL/L — SIGNIFICANT CHANGE UP (ref 96–108)
CO2 SERPL-SCNC: 20 MMOL/L — LOW (ref 22–31)
CO2 SERPL-SCNC: 21 MMOL/L — LOW (ref 22–31)
CREAT SERPL-MCNC: 0.59 MG/DL — SIGNIFICANT CHANGE UP (ref 0.5–1.3)
CREAT SERPL-MCNC: 0.61 MG/DL — SIGNIFICANT CHANGE UP (ref 0.5–1.3)
EGFR: 121 ML/MIN/1.73M2 — SIGNIFICANT CHANGE UP
EGFR: 122 ML/MIN/1.73M2 — SIGNIFICANT CHANGE UP
EOSINOPHIL # BLD AUTO: 0 K/UL — SIGNIFICANT CHANGE UP (ref 0–0.5)
EOSINOPHIL # BLD AUTO: 0.19 K/UL — SIGNIFICANT CHANGE UP (ref 0–0.5)
EOSINOPHIL NFR BLD AUTO: 0 % — SIGNIFICANT CHANGE UP (ref 0–6)
EOSINOPHIL NFR BLD AUTO: 1.8 % — SIGNIFICANT CHANGE UP (ref 0–6)
FIBRINOGEN PPP-MCNC: 627 MG/DL — HIGH (ref 330–520)
FIBRINOGEN PPP-MCNC: 649 MG/DL — HIGH (ref 330–520)
GLUCOSE SERPL-MCNC: 110 MG/DL — HIGH (ref 70–99)
GLUCOSE SERPL-MCNC: 76 MG/DL — SIGNIFICANT CHANGE UP (ref 70–99)
HCT VFR BLD CALC: 27.8 % — LOW (ref 34.5–45)
HCT VFR BLD CALC: 28.4 % — LOW (ref 34.5–45)
HGB BLD-MCNC: 9.3 G/DL — LOW (ref 11.5–15.5)
HGB BLD-MCNC: 9.6 G/DL — LOW (ref 11.5–15.5)
IMM GRANULOCYTES NFR BLD AUTO: 3.3 % — HIGH (ref 0–1.5)
INR BLD: 0.83 RATIO — LOW (ref 0.88–1.16)
INR BLD: 0.9 RATIO — SIGNIFICANT CHANGE UP (ref 0.88–1.16)
LDH SERPL L TO P-CCNC: 211 U/L — SIGNIFICANT CHANGE UP (ref 50–242)
LDH SERPL L TO P-CCNC: 252 U/L — HIGH (ref 50–242)
LYMPHOCYTES # BLD AUTO: 1.94 K/UL — SIGNIFICANT CHANGE UP (ref 1–3.3)
LYMPHOCYTES # BLD AUTO: 16.8 % — SIGNIFICANT CHANGE UP (ref 13–44)
LYMPHOCYTES # BLD AUTO: 2.38 K/UL — SIGNIFICANT CHANGE UP (ref 1–3.3)
LYMPHOCYTES # BLD AUTO: 22.5 % — SIGNIFICANT CHANGE UP (ref 13–44)
MANUAL SMEAR VERIFICATION: SIGNIFICANT CHANGE UP
MCHC RBC-ENTMCNC: 31.3 PG — SIGNIFICANT CHANGE UP (ref 27–34)
MCHC RBC-ENTMCNC: 31.6 PG — SIGNIFICANT CHANGE UP (ref 27–34)
MCHC RBC-ENTMCNC: 33.5 GM/DL — SIGNIFICANT CHANGE UP (ref 32–36)
MCHC RBC-ENTMCNC: 33.8 GM/DL — SIGNIFICANT CHANGE UP (ref 32–36)
MCV RBC AUTO: 93.4 FL — SIGNIFICANT CHANGE UP (ref 80–100)
MCV RBC AUTO: 93.6 FL — SIGNIFICANT CHANGE UP (ref 80–100)
METAMYELOCYTES # FLD: 0.9 % — HIGH (ref 0–0)
MONOCYTES # BLD AUTO: 0.3 K/UL — SIGNIFICANT CHANGE UP (ref 0–0.9)
MONOCYTES # BLD AUTO: 0.64 K/UL — SIGNIFICANT CHANGE UP (ref 0–0.9)
MONOCYTES NFR BLD AUTO: 2.6 % — SIGNIFICANT CHANGE UP (ref 2–14)
MONOCYTES NFR BLD AUTO: 6 % — SIGNIFICANT CHANGE UP (ref 2–14)
MYELOCYTES NFR BLD: 0.9 % — HIGH (ref 0–0)
NEUTROPHILS # BLD AUTO: 6.95 K/UL — SIGNIFICANT CHANGE UP (ref 1.8–7.4)
NEUTROPHILS # BLD AUTO: 9.08 K/UL — HIGH (ref 1.8–7.4)
NEUTROPHILS NFR BLD AUTO: 65.7 % — SIGNIFICANT CHANGE UP (ref 43–77)
NEUTROPHILS NFR BLD AUTO: 78.8 % — HIGH (ref 43–77)
NRBC # BLD: 0 /100 WBCS — SIGNIFICANT CHANGE UP (ref 0–0)
PLAT MORPH BLD: NORMAL — SIGNIFICANT CHANGE UP
PLATELET # BLD AUTO: 264 K/UL — SIGNIFICANT CHANGE UP (ref 150–400)
PLATELET # BLD AUTO: 295 K/UL — SIGNIFICANT CHANGE UP (ref 150–400)
POTASSIUM SERPL-MCNC: 3.8 MMOL/L — SIGNIFICANT CHANGE UP (ref 3.5–5.3)
POTASSIUM SERPL-MCNC: 4.1 MMOL/L — SIGNIFICANT CHANGE UP (ref 3.5–5.3)
POTASSIUM SERPL-SCNC: 3.8 MMOL/L — SIGNIFICANT CHANGE UP (ref 3.5–5.3)
POTASSIUM SERPL-SCNC: 4.1 MMOL/L — SIGNIFICANT CHANGE UP (ref 3.5–5.3)
PROT SERPL-MCNC: 6 G/DL — SIGNIFICANT CHANGE UP (ref 6–8.3)
PROT SERPL-MCNC: 6.7 G/DL — SIGNIFICANT CHANGE UP (ref 6–8.3)
PROTHROM AB SERPL-ACNC: 10.4 SEC — LOW (ref 10.5–13.4)
PROTHROM AB SERPL-ACNC: 9.6 SEC — LOW (ref 10.5–13.4)
RBC # BLD: 2.97 M/UL — LOW (ref 3.8–5.2)
RBC # BLD: 3.04 M/UL — LOW (ref 3.8–5.2)
RBC # FLD: 13.1 % — SIGNIFICANT CHANGE UP (ref 10.3–14.5)
RBC # FLD: 13.2 % — SIGNIFICANT CHANGE UP (ref 10.3–14.5)
RBC BLD AUTO: SIGNIFICANT CHANGE UP
SODIUM SERPL-SCNC: 140 MMOL/L — SIGNIFICANT CHANGE UP (ref 135–145)
SODIUM SERPL-SCNC: 140 MMOL/L — SIGNIFICANT CHANGE UP (ref 135–145)
URATE SERPL-MCNC: 6.3 MG/DL — SIGNIFICANT CHANGE UP (ref 2.5–7)
URATE SERPL-MCNC: 6.7 MG/DL — SIGNIFICANT CHANGE UP (ref 2.5–7)
WBC # BLD: 10.58 K/UL — HIGH (ref 3.8–10.5)
WBC # BLD: 11.52 K/UL — HIGH (ref 3.8–10.5)
WBC # FLD AUTO: 10.58 K/UL — HIGH (ref 3.8–10.5)
WBC # FLD AUTO: 11.52 K/UL — HIGH (ref 3.8–10.5)

## 2022-06-19 PROCEDURE — 85025 COMPLETE CBC W/AUTO DIFF WBC: CPT

## 2022-06-19 PROCEDURE — 84550 ASSAY OF BLOOD/URIC ACID: CPT

## 2022-06-19 PROCEDURE — 85610 PROTHROMBIN TIME: CPT

## 2022-06-19 PROCEDURE — 85730 THROMBOPLASTIN TIME PARTIAL: CPT

## 2022-06-19 PROCEDURE — 85384 FIBRINOGEN ACTIVITY: CPT

## 2022-06-19 PROCEDURE — 80053 COMPREHEN METABOLIC PANEL: CPT

## 2022-06-19 PROCEDURE — 36415 COLL VENOUS BLD VENIPUNCTURE: CPT

## 2022-06-19 PROCEDURE — 83615 LACTATE (LD) (LDH) ENZYME: CPT

## 2022-06-19 RX ORDER — ACETAMINOPHEN 500 MG
1000 TABLET ORAL ONCE
Refills: 0 | Status: COMPLETED | OUTPATIENT
Start: 2022-06-19 | End: 2022-06-19

## 2022-06-19 RX ADMIN — Medication 400 MILLIGRAM(S): at 17:53

## 2022-06-19 NOTE — OB PROVIDER TRIAGE NOTE - HISTORY OF PRESENT ILLNESS
HPI: 33yo F  POD 3 s/p uncomplicated elective pLTCS presenting with persistent positional headache. Pt states she noticed that her HA was worse when she got home and was ambulating more. Pt states she feels no headache when she lays down flat, but has a throbbing headache when she sits up and especially with ambulating. Pt denies vision changes, RUQ pain. Pt denies n/v/SOB/CP.       OBHx: G1: 22 elective pLTCS, uncomplicated,    GynHx: denies hx STIs, fibroids, polyps, cysts  PMH: denies hx clotting or bleeding disorders, HTN, DM  PSH: denies  PFH: no hx congenital disorders, bleeding/clotting disorders  Psych: denies   Social: denies etoh, smoking, drugs. Safe at home/in relationship.  Meds: PNV, iron   Allergies: NKDA  Will accept blood transfusions? Yes    Vital Signs Last 24 Hrs  T(C): --  T(F): --  HR: 91 (2022 17:10) (83 - 99)  BP: 133/101 (2022 16:59) (133/101 - 133/101)  BP(mean): --  RR: --  SpO2: 100% (2022 17:10) (100% - 100%)    Physical Exam:  Gen: NAD, AOx3  CV: RR  Resp: unlabored respirations  Abd: soft, NT, ND

## 2022-06-19 NOTE — PRE-ANESTHESIA EVALUATION ADULT - NSANTHPMHFT_GEN_ALL_CORE
S/p primary  section   Requiring spinal placement x 2 per anesthesia record  Patient discharged  and per patient new onset headache developed  in pm  Pain is relieved when lying down, worsened when sitting and ambulating.  No fever, blurred vision; incisional pain.  Headache rates 10/10 in intensity, throbbing located in frontal area and radiating to neck.  No other PMH.

## 2022-06-19 NOTE — PRE-ANESTHESIA EVALUATION ADULT - NSANTHADDINFOFT_GEN_ALL_CORE
Plan for blood patch, including risks and benefits, discussed extensively with patient.  Risks included but not limited to: accidental PDPH, back pain, nerve damage, bleeding, infection, ineffective relief of headache requiring additional blood patch.  Patient conveyed understanding and is requesting epidural blood patch.  D/W Dr. Rodgers at bedside as well and is in agreement.

## 2022-06-19 NOTE — PRE-ANESTHESIA EVALUATION ADULT - NSANTHOSAYNRD_GEN_A_CORE
No. KHAI screening performed.  STOP BANG Legend: 0-2 = LOW Risk; 3-4 = INTERMEDIATE Risk; 5-8 = HIGH Risk

## 2022-06-19 NOTE — OB RN TRIAGE NOTE - FALL HARM RISK - UNIVERSAL INTERVENTIONS
Bed in lowest position, wheels locked, appropriate side rails in place/Call bell, personal items and telephone in reach/Instruct patient to call for assistance before getting out of bed or chair/Non-slip footwear when patient is out of bed/[place] to call system/Physically safe environment - no spills, clutter or unnecessary equipment/Purposeful Proactive Rounding/Room/bathroom lighting operational, light cord in reach

## 2022-06-20 PROBLEM — Z78.9 OTHER SPECIFIED HEALTH STATUS: Chronic | Status: ACTIVE | Noted: 2022-06-16

## 2022-06-21 ENCOUNTER — NON-APPOINTMENT (OUTPATIENT)
Age: 32
End: 2022-06-21

## 2023-03-16 NOTE — DISCHARGE NOTE OB - MEDICATION SUMMARY - MEDICATIONS TO STOP TAKING
returned to bed/full weight-bearing I will STOP taking the medications listed below when I get home from the hospital:  None

## 2025-05-27 NOTE — OB RN DELIVERY SUMMARY - NS_SEPSISRSKCALC_OBGYN_ALL_OB_FT
no EOS calculated successfully. EOS Risk Factor: 0.5/1000 live births ([facility] national incidence); GA=40w6d; Temp=97.7; ROM=0.05; GBS='Negative'; Antibiotics='No antibiotics or any antibiotics < 2 hrs prior to birth'